# Patient Record
Sex: MALE | Race: WHITE | NOT HISPANIC OR LATINO | Employment: FULL TIME | URBAN - METROPOLITAN AREA
[De-identification: names, ages, dates, MRNs, and addresses within clinical notes are randomized per-mention and may not be internally consistent; named-entity substitution may affect disease eponyms.]

---

## 2019-07-24 ENCOUNTER — OFFICE VISIT (OUTPATIENT)
Dept: URGENT CARE | Facility: CLINIC | Age: 29
End: 2019-07-24
Payer: COMMERCIAL

## 2019-07-24 VITALS
RESPIRATION RATE: 14 BRPM | OXYGEN SATURATION: 96 % | HEIGHT: 67 IN | SYSTOLIC BLOOD PRESSURE: 117 MMHG | BODY MASS INDEX: 20.28 KG/M2 | DIASTOLIC BLOOD PRESSURE: 76 MMHG | HEART RATE: 103 BPM | TEMPERATURE: 97.9 F | WEIGHT: 129.2 LBS

## 2019-07-24 DIAGNOSIS — R07.9 CHEST PAIN, UNSPECIFIED TYPE: Primary | ICD-10-CM

## 2019-07-24 DIAGNOSIS — R22.1 LOCALIZED SWELLING, MASS AND LUMP, NECK: ICD-10-CM

## 2019-07-24 PROCEDURE — 99203 OFFICE O/P NEW LOW 30 MIN: CPT | Performed by: PHYSICIAN ASSISTANT

## 2019-07-24 NOTE — PATIENT INSTRUCTIONS
In case the cause of your chest pain in acid reflux, begin Pepcid 40mg twice a day  Eat a bland diet avoiding caffeine and acidic foods  Motrin as directed for lump in neck  Follow up with PCP  Proceed to  ER if symptoms worsen

## 2019-07-24 NOTE — PROGRESS NOTES
[facility]        NAME: Zohra Corea is a 34 y o  male  : 1990    MRN: [id number]  DATE: 2019  TIME: 12:24 PM    Assessment and Plan   Chest pain, unspecified type [R07 9]  1  Chest pain, unspecified type     2  Localized swelling, mass and lump, neck       Discussed possibilities for patient's symptoms  Acid reflux more likely causing his burning chest pain that comes on more noticeably after eating certain foods and a sore throat mainly in the morning  Advised him to eat a bland diet and begin Pepcid  The lump in his neck more likely a an enlarged lymph node  May be due to irritation and throat from acid reflux or early viral illness  Rapid strep was negative in office  Patient understood the need to follow up with PCP  He declined making an apt through the   Patient Instructions     In case the cause of your chest pain is acid reflux, begin Pepcid 40mg twice a day  Eat a bland diet avoiding caffeine and acidic foods  Motrin as directed for lump in neck  Follow up with PCP  Proceed to  ER if symptoms worsen  Chief Complaint     Chief Complaint   Patient presents with    Chest Pain     Pt states that he had chest pain on and off for several weeks and that he has a lump on his neck he was already seen [facility] for the same issue and is now here becasue he does not a regular Dr          History of Present Illness       Patient is a 32-year-old male presenting for left-sided chest pain and a lump in the right side of his neck  He reports the chest pain began about 2 weeks ago, last about 5-10 minutes at a time to and repeats approximately every 30 minutes  The pain feels like a burning   He has noticed it comes on when drinking soda or eating any salty acidic foods  He has also had a sore throat for the past 2 weeks with it, more so in the morning and it waxes and wanes during the day    He denies the pain coming on with exertion and has no associated nausea, vomiting, shortness of breath, palpitations, dizziness  He denies any personal cardiac history or family history of cardiac disease at a young age  He denies any illicit drug use  The lump on the right side of his neck appeared about 1 week ago and causes some discomfort at times along with a headache  He has felt feverish for the past 3 mornings with it and slightly unwell, no recorded temperature  He denies any cold symptoms  He was seen at a [facility] for his symptoms the reports they did not give him any answers  They did perform blood work which came back [employer]  He does not have a primary care  Review of Systems   Review of Systems   Constitutional: Positive for fatigue and fever  HENT: Positive for sore throat  Negative for congestion and ear pain  Eyes: Negative for pain, redness and visual disturbance  Respiratory: Negative for shortness of breath  Cardiovascular: Positive for chest pain  Negative for palpitations  Gastrointestinal: Negative for nausea and vomiting  Musculoskeletal: Positive for neck pain  Negative for neck stiffness  Skin: Negative for rash and wound  Neurological: Positive for headaches  Negative for dizziness  Current Medications     No current outpatient medications on file  Current Allergies     Allergies as of 07/24/2019 - Reviewed 07/24/2019   Allergen Reaction Noted    Amoxicillin  07/05/2012    Sulfa antibiotics  07/05/2012            The following portions of the patient's history were reviewed and updated as appropriate: allergies, current medications, past family history, past medical history, past social history, past surgical history and problem list      History reviewed  No pertinent past medical history  History reviewed  No pertinent surgical history  History reviewed  No pertinent family history  Medications have been verified          Objective   /76   Pulse 103   Temp 97 9 °F (36 6 °C) (Temporal) Resp 14   Ht 5' 7" (1 702 m)   Wt 58 6 kg (129 lb 3 2 oz)   SpO2 96%   BMI 20 24 kg/m²        Physical Exam     Physical Exam   Constitutional: He is oriented to person, place, and time  He appears well-developed and well-nourished  He does not have a sickly appearance  He does not appear ill  No distress  HENT:   Head: Normocephalic and atraumatic  Mouth/Throat: Uvula is midline, oropharynx is clear and moist and mucous membranes are normal  Tonsils are 1+ on the right  Tonsils are 1+ on the left  No tonsillar exudate  Eyes: Pupils are equal, round, and reactive to light  Conjunctivae and EOM are normal    Neck:   Lump felt right upper side of neck  No increase in tenderness to palpation  No overlying erythema or warmth  Cardiovascular: Normal rate, regular rhythm, normal heart sounds and intact distal pulses  Pulmonary/Chest: Effort normal and breath sounds normal    Neurological: He is alert and oriented to person, place, and time  Skin: Skin is warm and dry  He is not diaphoretic  Psychiatric: He has a normal mood and affect   His behavior is normal

## 2019-07-31 ENCOUNTER — OFFICE VISIT (OUTPATIENT)
Dept: FAMILY MEDICINE CLINIC | Facility: CLINIC | Age: 29
End: 2019-07-31
Payer: COMMERCIAL

## 2019-07-31 VITALS
RESPIRATION RATE: 16 BRPM | WEIGHT: 132 LBS | HEIGHT: 67 IN | DIASTOLIC BLOOD PRESSURE: 70 MMHG | HEART RATE: 89 BPM | BODY MASS INDEX: 20.72 KG/M2 | OXYGEN SATURATION: 98 % | SYSTOLIC BLOOD PRESSURE: 110 MMHG

## 2019-07-31 DIAGNOSIS — K21.9 GASTROESOPHAGEAL REFLUX DISEASE, ESOPHAGITIS PRESENCE NOT SPECIFIED: ICD-10-CM

## 2019-07-31 DIAGNOSIS — Z76.89 ENCOUNTER TO ESTABLISH CARE: Primary | ICD-10-CM

## 2019-07-31 DIAGNOSIS — Z91.09 ALLERGY TO POISON IVY: ICD-10-CM

## 2019-07-31 PROCEDURE — 1036F TOBACCO NON-USER: CPT | Performed by: FAMILY MEDICINE

## 2019-07-31 PROCEDURE — 3008F BODY MASS INDEX DOCD: CPT | Performed by: FAMILY MEDICINE

## 2019-07-31 PROCEDURE — 99204 OFFICE O/P NEW MOD 45 MIN: CPT | Performed by: FAMILY MEDICINE

## 2019-07-31 RX ORDER — FAMOTIDINE 20 MG/1
20 TABLET, FILM COATED ORAL 2 TIMES DAILY
COMMUNITY

## 2019-07-31 RX ORDER — MULTIVITAMIN
1 TABLET ORAL DAILY
COMMUNITY

## 2019-07-31 RX ORDER — METHYLPREDNISOLONE 4 MG/1
TABLET ORAL
Qty: 21 EACH | Refills: 0 | Status: SHIPPED | OUTPATIENT
Start: 2019-07-31 | End: 2019-09-25

## 2019-07-31 NOTE — PATIENT INSTRUCTIONS
Diet for Stomach Ulcers and Gastritis   WHAT YOU NEED TO KNOW:   What is a diet for stomach ulcers and gastritis? A diet for ulcers and gastritis is a meal plan that limits foods that irritate your stomach  Certain foods may worsen symptoms such as stomach pain, bloating, heartburn, or indigestion  Which foods should I limit or avoid? You may need to avoid acidic, spicy, or high-fat foods  Not all foods affect everyone the same way  You will need to learn which foods worsen your symptoms and limit those foods  The following are some foods that may worsen ulcer or gastritis symptoms:  Beverages:      Whole milk and chocolate milk    Hot cocoa and cola    Any beverage with caffeine    Regular and decaffeinated coffee    Peppermint and spearmint tea    Green and black tea, with or without caffeine    Orange and grapefruit juices    Drinks that contain alcohol    Spices and seasonings:      Black and red pepper    Chili powder    Mustard seed and nutmeg    Other foods:      Dairy foods made from whole milk or cream    Chocolate    Spicy or strongly flavored cheeses, such as jalapeno or black pepper    Highly seasoned, high-fat meats, such as sausage, salami, mason, ham, and cold cuts    Hot chiles and peppers    Tomato products, such as tomato paste, tomato sauce, or tomato juice  Which foods can I eat and drink? Eat a variety of healthy foods from all the food groups  Eat fruits, vegetables, whole grains, and fat-free or low-fat dairy foods  Whole grains include whole-wheat breads, cereals, pasta, and brown rice  Choose lean meats, poultry (chicken and turkey), fish, beans, eggs, and nuts  A healthy meal plan is low in unhealthy fats, salt, and added sugar  Healthy fats include olive oil and canola oil  Ask your dietitian for more information about a healthy meal plan  What other guidelines may be helpful? Do not eat right before bedtime  Stop eating at least 2 hours before bedtime      Eat small, frequent meals   Your stomach may tolerate small, frequent meals better than large meals  CARE AGREEMENT:   You have the right to help plan your care  Discuss treatment options with your caregivers to decide what care you want to receive  You always have the right to refuse treatment  The above information is an  only  It is not intended as medical advice for individual conditions or treatments  Talk to your doctor, nurse or pharmacist before following any medical regimen to see if it is safe and effective for you  © 2017 [facility] Information is for End User's use only and may not be sold, redistributed or otherwise used for commercial purposes  All illustrations and images included in CareNotes® are the copyrighted property of A D A M [employer] Inc  or M[employer] Prince  Gastroesophageal Reflux Disease   AMBULATORY CARE:   Gastroesophageal reflux  reflux occurs when acid and food in the stomach back up into the esophagus  Gastroesophageal reflux disease (GERD) is reflux that occurs more than twice a week for a few weeks  It usually causes heartburn and other symptoms  GERD can cause other health problems over time if it is not treated  Common symptoms include:  Heartburn is the most common symptom of GERD  You may feel burning pain in your chest or below the breast bone  This usually occurs after meals and spreads to your neck, jaw, or shoulder  The pain gets better when you change positions  You may also have any of the following:  · Bitter or acid taste in your mouth    · Dry cough    · Trouble swallowing or pain with swallowing    · Hoarseness or sore throat    · Frequent burping or hiccups    · Feeling of fullness soon after you start eating  Seek care immediately if:  · You feel full and cannot burp or vomit  · You have severe chest pain and sudden trouble breathing  · Your bowel movements are black, bloody, or tarry-looking      · Your vomit looks like coffee grounds or has blood in it  Contact your healthcare provider if:   · You vomit large amounts, or you vomit often  · You have trouble breathing after you vomit  · You have trouble swallowing, or pain with swallowing  · You are losing weight without trying  · Your symptoms get worse or do not improve with treatment  · You have questions or concerns about your condition or care  Treatment for GERD:  Your healthcare provider may prescribe medicine to decrease stomach acid  He may also prescribe medicine that help your esophagus and stomach move food and liquid to your intestines  Surgery may be done if other treatments do not work  You may need surgery to wrap the upper part of the stomach around the esophageal sphincter  This will strengthen the sphincter and prevent reflux  Manage GERD:   · Do not have foods or drinks that may increase heartburn  These include chocolate, peppermint, fried or fatty foods, drinks that contain caffeine, or carbonated drinks (soda)  Other foods include spicy foods, onions, tomatoes, and tomato-based foods  Do not have foods or drinks that can irritate your esophagus, such as citrus fruits, juices, and alcohol  · Do not eat large meals  When you eat a lot of food at one time, your stomach needs more acid to digest it  Eat 6 small meals each day instead of 3 large ones, and eat slowly  Do not eat meals 2 to 3 hours before bedtime  · Elevate the head of your bed  Place 6-inch blocks under the head of your bed frame  You may also use more than one pillow under your head and shoulders while you sleep  · Maintain a healthy weight  If you are overweight, weight loss may help relieve symptoms of GERD  · Do not smoke  Smoking weakens the lower esophageal sphincter and increases the risk of GERD  Ask your healthcare provider for information if you currently smoke and need help to quit  E-cigarettes or smokeless tobacco still contain nicotine   Talk to your healthcare provider before you use these products  · Do not wear clothing that is tight around your waist   Tight clothing can put pressure on your stomach and cause or worsen GERD symptoms  Follow up with your healthcare provider as directed:  Write down your questions so you remember to ask them during your visits  © 2017 [address] [employer] Information is for End User's use only and may not be sold, redistributed or otherwise used for commercial purposes  All illustrations and images included in CareNotes® are the copyrighted property of [employer]  or Mj Prince  The above information is an  only  It is not intended as medical advice for individual conditions or treatments  Talk to your doctor, nurse or pharmacist before following any medical regimen to see if it is safe and effective for you

## 2019-07-31 NOTE — PROGRESS NOTES
Assessment/Plan:   Diagnoses and all orders for this visit:  Encounter to establish care  -     Multiple Vitamin (MULTIVITAMIN) tablet; Take 1 tablet by mouth daily  - reviewed nml labs - CBC, CMP and TSH  - pt would like to schedule his annual exam for Sept     Gastroesophageal reflux disease, esophagitis presence not specified  -     famotidine (PEPCID) 20 mg tablet; Take 20 mg by mouth 2 (two) times a day  - educational handout given to pt  - educated on common triggers but will have to discern his own triggers  - advised against eating late and to elevate HOB 15deg     Allergy to poison ivy  -     methylPREDNISolone 4 MG tablet therapy pack; Use as directed on package          Subjective:    Patient ID: Moshe Restrepo is a 34 y o  male    Moshe Restrepo is a 34 y o  male who presents to the office to establish care   1) Establish Care   - PMHx: GERD   - allergies: Amox (rxn unknown), Sulfa Abx (hives)   - Meds: Pepcid 20mg BID, MVI   - PSHx: none   - FHx: M (GERD)  - diet/exercise:   - social: denies tob, social EtOH, recreational MJ use (last use was yesterday)   - sexual Hx: active with GF, no BC   - reviewed nml CBC, CMP, TSH   2) GERD  - was seen at the  on 7/24 and started on Pepcid   - only acts up after eating certain foods (manderine orange, red/orange peppers, spicy foods, lots of salt) and stress    - no longer with burning chest pain or sore throat  - has been trying to watch his diet and eat bland foods  - (+) FHx of GERD in Mom   - denies F/C/N/V/sore throat/enlarged LNs/CP/palpitations/SOB/abd pain/LE edema       The following portions of the patient's history were reviewed and updated as appropriate: allergies, current medications, past family history, past medical history, past social history, past surgical history and problem list     Review of Systems  as per HPI    Objective:  /70   Pulse 89   Resp 16   Ht 5' 7" (1 702 m)   Wt 59 9 kg (132 lb)   SpO2 98%   BMI 20 67 kg/m² Physical Exam   Constitutional: He is oriented to person, place, and time  He appears well-developed and well-nourished  No distress  HENT:   Head: Normocephalic and atraumatic  Right Ear: External ear normal    Left Ear: External ear normal    Nose: Nose normal    Eyes: Conjunctivae and EOM are normal  Right eye exhibits no discharge  Left eye exhibits no discharge  No scleral icterus  Neck: Normal range of motion  Neck supple  No tracheal deviation present  Cardiovascular: Normal rate, regular rhythm and normal heart sounds  Exam reveals no gallop and no friction rub  No murmur heard  Pulmonary/Chest: Effort normal and breath sounds normal  No stridor  No respiratory distress  He has no wheezes  He has no rales  Abdominal: Soft  Bowel sounds are normal    Musculoskeletal: Normal range of motion  Lymphadenopathy:     He has no cervical adenopathy  Neurological: He is alert and oriented to person, place, and time  Skin: Skin is warm  Rash noted  He is not diaphoretic  Erythematous, pruritic cluster rash on medial aspect of R-knee    Psychiatric: He has a normal mood and affect  His behavior is normal    Vitals reviewed

## 2019-09-25 ENCOUNTER — OFFICE VISIT (OUTPATIENT)
Dept: FAMILY MEDICINE CLINIC | Facility: CLINIC | Age: 29
End: 2019-09-25
Payer: COMMERCIAL

## 2019-09-25 VITALS
WEIGHT: 135 LBS | HEART RATE: 80 BPM | BODY MASS INDEX: 21.19 KG/M2 | HEIGHT: 67 IN | RESPIRATION RATE: 16 BRPM | SYSTOLIC BLOOD PRESSURE: 100 MMHG | DIASTOLIC BLOOD PRESSURE: 62 MMHG | OXYGEN SATURATION: 99 %

## 2019-09-25 DIAGNOSIS — Z28.21 INFLUENZA VACCINATION DECLINED BY PATIENT: ICD-10-CM

## 2019-09-25 DIAGNOSIS — Z00.00 ANNUAL PHYSICAL EXAM: Primary | ICD-10-CM

## 2019-09-25 DIAGNOSIS — K21.9 GASTROESOPHAGEAL REFLUX DISEASE, ESOPHAGITIS PRESENCE NOT SPECIFIED: ICD-10-CM

## 2019-09-25 PROCEDURE — 99395 PREV VISIT EST AGE 18-39: CPT | Performed by: FAMILY MEDICINE

## 2019-09-25 RX ORDER — UBIDECARENONE 75 MG
CAPSULE ORAL DAILY
COMMUNITY

## 2019-09-25 NOTE — PATIENT INSTRUCTIONS

## 2019-09-25 NOTE — PROGRESS NOTES
Assessment/Plan:   Diagnoses and all orders for this visit:    Annual physical exam  - reviewed PMHx, PSHx, meds, allergies, FHx, Soc Hx and Sexual Hx   - UTD with Tdap   - declined Flu vaccine in the office today   - declined STD screening in the office today   - UTD with Optho and Dental visits  - nml TSH, CMP and CBC 7/2019   - form filled out for work   - RTO PRN/1yr for annual exam - pt aware and agreeable   Influenza vaccination declined by patient    Gastroesophageal reflux disease, esophagitis presence not specified  - diet controlled     Other orders  -     Cancel: TDAP VACCINE GREATER THAN OR EQUAL TO 6YO IM  -     Cancel: influenza vaccine, [id number], quadrivalent, 0 5 mL, preservative-free, for adult and pediatric patients 6 mos+ (AFLURIA, FLUARIX, FLULAVAL, FLUZONE)  -     cyanocobalamin (VITAMIN B-12) 100 mcg tablet; Take by mouth daily        Subjective:    Patient ID: Zohra Corea is a 34 y o  male    Zohra Corea is a 34 y o  male who presents to the office for an annual exam  - PMHx: GERD, h/o ADHD (outgrown)   - allergies: Amox (rxn unknown), Sulfa Abx (hives)   - Meds: Pepcid 20mg BID, MVI, Biotin, Benafiber   - PSHx: none   - FHx: M (GERD)  - IMMs: UTD with Tdap (1/2017), declined Flu vaccine in the office today   - Hm: Cscope   - diet/exercise: active and "balanced diet"   - social:  (+) occasional vaping, social EtOH, recreational MJ use (last use was yesterday)   - sexual Hx: active with GF, no BC, declined STD screening   - last vision: wears glasses, last Optho 8/2019 - goes annually   - last dental: 8/2019 - has to schedule f/u appt  - ROS: today in the office pt denies F/C/N/V/HA/visual changes/CP/palpitations/SOB/wheezing/abd pain/D/constipation/LE edema or calf tenderness  - nml TSH, CMP and CBC from 7/2019     The following portions of the patient's history were reviewed and updated as appropriate: allergies, current medications, past family history, past medical history, past social history, past surgical history and problem list     Review of Systems  as per HPI    Objective:  /62   Pulse 80   Resp 16   Ht 5' 7" (1 702 m)   Wt 61 2 kg (135 lb)   SpO2 99%   BMI 21 14 kg/m²    Physical Exam   Constitutional: He is oriented to person, place, and time  He appears well-developed and well-nourished  No distress  HENT:   Head: Normocephalic and atraumatic  Right Ear: Tympanic membrane, external ear and ear canal normal  No drainage, swelling or tenderness  No middle ear effusion  Left Ear: Tympanic membrane, external ear and ear canal normal  No drainage, swelling or tenderness  No middle ear effusion  Nose: Nose normal  No septal deviation  Mouth/Throat: Uvula is midline, oropharynx is clear and moist and mucous membranes are normal  No uvula swelling  No oropharyngeal exudate, posterior oropharyngeal edema, posterior oropharyngeal erythema or tonsillar abscesses  Eyes: Pupils are equal, round, and reactive to light  Conjunctivae and EOM are normal  Right eye exhibits no discharge  Left eye exhibits no discharge  No scleral icterus  Neck: Normal range of motion  Neck supple  No tracheal deviation present  Cardiovascular: Normal rate, regular rhythm and normal heart sounds  Exam reveals no gallop and no friction rub  No murmur heard  Pulmonary/Chest: Effort normal and breath sounds normal  No stridor  No respiratory distress  He has no wheezes  He has no rales  Abdominal: Soft  Bowel sounds are normal  He exhibits no distension  There is no tenderness  There is no guarding  Musculoskeletal: Normal range of motion  He exhibits no edema, tenderness or deformity  Lymphadenopathy:     He has no cervical adenopathy  Neurological: He is alert and oriented to person, place, and time  Skin: Skin is warm  He is not diaphoretic  Psychiatric: He has a normal mood and affect  His behavior is normal    Vitals reviewed

## 2020-09-30 ENCOUNTER — OFFICE VISIT (OUTPATIENT)
Dept: FAMILY MEDICINE CLINIC | Facility: CLINIC | Age: 30
End: 2020-09-30
Payer: COMMERCIAL

## 2020-09-30 VITALS
HEART RATE: 93 BPM | WEIGHT: 121 LBS | RESPIRATION RATE: 16 BRPM | OXYGEN SATURATION: 98 % | DIASTOLIC BLOOD PRESSURE: 80 MMHG | SYSTOLIC BLOOD PRESSURE: 116 MMHG | HEIGHT: 65 IN | BODY MASS INDEX: 20.16 KG/M2 | TEMPERATURE: 98.6 F

## 2020-09-30 DIAGNOSIS — Z28.21 INFLUENZA VACCINATION DECLINED BY PATIENT: ICD-10-CM

## 2020-09-30 DIAGNOSIS — Z72.0 CURRENT NICOTINE VAPING ON SOME DAYS: ICD-10-CM

## 2020-09-30 DIAGNOSIS — Z83.3 FAMILY HISTORY OF DIABETES MELLITUS IN GRANDFATHER: ICD-10-CM

## 2020-09-30 DIAGNOSIS — Z00.00 ANNUAL PHYSICAL EXAM: Primary | ICD-10-CM

## 2020-09-30 DIAGNOSIS — K21.9 GASTROESOPHAGEAL REFLUX DISEASE, ESOPHAGITIS PRESENCE NOT SPECIFIED: ICD-10-CM

## 2020-09-30 PROCEDURE — 99395 PREV VISIT EST AGE 18-39: CPT | Performed by: FAMILY MEDICINE

## 2020-09-30 PROCEDURE — 3725F SCREEN DEPRESSION PERFORMED: CPT | Performed by: FAMILY MEDICINE

## 2020-09-30 PROCEDURE — 1036F TOBACCO NON-USER: CPT | Performed by: FAMILY MEDICINE

## 2020-09-30 NOTE — PROGRESS NOTES
Assessment/Plan:   Diagnoses and all orders for this visit:    Annual physical exam  - reviewed PMHx, PSHx, meds, allergies, FHx, Soc Hx and Sexual Hx   - UTD with Tdap (2017)  - declined Flu vaccine in the office today   - declined STD screening in the office today   - overdue for Optho and Dental - has visits scheduled   - due for labs - script given   - form filled out for work   - RTO PRN/1yr for annual exam - pt aware and agreeable   Current nicotine vaping on some days  Influenza vaccination declined by patient    Gastroesophageal reflux disease, esophagitis presence not specified  - mostly diet controlled but Pepcid PRN     Family history of diabetes mellitus in grandfather  -     CBC and differential; Future  -     Comprehensive metabolic panel; Future    Other orders  -     Cancel: influenza vaccine, quadrivalent, 0 5 mL, preservative-free, for adult and pediatric patients 6 mos+ (AFLURIA, FLUARIX, FLULAVAL, FLUZONE)          Subjective:    Patient ID: [facility] is a 31 y o  male    [facility] is a 31 y o  male who presents to the office for an annual exam  - PMHx: GERD, h/o ADHD (outgrown)   - allergies: Amox (rxn unknown), Sulfa Abx (hives)   - Meds: Pepcid 20mg PRN, MVI, Biotin, B12  - PSHx: none   - FHx: M (GERD, Breast Ca dx at 39), F (asthma, colon polyp, skin ca), MGM (tacycardia, HL), MGF (HTN, DM, HL, BPH)   - IMMs: UTD with Tdap (1/2017), declined Flu vaccine in the office today   - Hm: Cscope 2012  - diet/exercise: active and "balanced diet"   - social:  (+) occasional vaping, social EtOH, recreational MJ use (last use was earlier today)   - sexual Hx: active with GF, no BC, declined STD screening   - last vision: wears glasses, due in 10/2020 - [employer]   - last dental: has to reschedule as missed his appt this past Friday   - ROS: today in the office pt denies F/C/N/V/HA/visual changes/CP/palpitations/SOB/wheezing/cough/abd pain/D/constipation/LE edema or calf tenderness      The following portions of the patient's history were reviewed and updated as appropriate: allergies, current medications, past family history, past medical history, past social history, past surgical history and problem list     Review of Systems  as per HPI    Objective:  /80 (BP Location: Left arm, Patient Position: Sitting, Cuff Size: Standard)   Pulse 93   Temp 98 6 °F (37 °C) (Tympanic)   Resp 16   Ht 5' 5" (1 651 m)   Wt 54 9 kg (121 lb)   SpO2 98%   BMI 20 14 kg/m²    Physical Exam  Vitals signs reviewed  Constitutional:       General: He is not in acute distress  Appearance: Normal appearance  He is normal weight  He is not ill-appearing, toxic-appearing or diaphoretic  HENT:      Head: Normocephalic and atraumatic  Right Ear: Tympanic membrane, ear canal and external ear normal  There is no impacted cerumen  Left Ear: Tympanic membrane, ear canal and external ear normal  There is no impacted cerumen  Nose: Nose normal       Mouth/Throat:      Mouth: Mucous membranes are moist       Pharynx: Oropharynx is clear  No oropharyngeal exudate or posterior oropharyngeal erythema  Eyes:      General: No scleral icterus  Right eye: No discharge  Left eye: No discharge  Extraocular Movements: Extraocular movements intact  Conjunctiva/sclera: Conjunctivae normal       Pupils: Pupils are equal, round, and reactive to light  Neck:      Musculoskeletal: Normal range of motion  Cardiovascular:      Rate and Rhythm: Normal rate and regular rhythm  Heart sounds: Normal heart sounds  No murmur  No friction rub  No gallop  Pulmonary:      Effort: Pulmonary effort is normal  No respiratory distress  Breath sounds: Normal breath sounds  No stridor  No wheezing, rhonchi or rales  Abdominal:      General: Abdomen is flat  Bowel sounds are normal       Palpations: Abdomen is soft  Musculoskeletal: Normal range of motion           General: No swelling, tenderness, deformity or signs of injury  Right lower leg: No edema  Left lower leg: No edema  Skin:     General: Skin is warm  Neurological:      General: No focal deficit present  Mental Status: He is alert and oriented to person, place, and time     Psychiatric:         Mood and Affect: Mood normal          Behavior: Behavior normal